# Patient Record
Sex: MALE | Race: WHITE | NOT HISPANIC OR LATINO | Employment: PART TIME | URBAN - METROPOLITAN AREA
[De-identification: names, ages, dates, MRNs, and addresses within clinical notes are randomized per-mention and may not be internally consistent; named-entity substitution may affect disease eponyms.]

---

## 2019-03-03 ENCOUNTER — HOSPITAL ENCOUNTER (EMERGENCY)
Facility: HOSPITAL | Age: 44
Discharge: HOME | End: 2019-03-03
Attending: EMERGENCY MEDICINE

## 2019-03-03 VITALS
TEMPERATURE: 98 F | SYSTOLIC BLOOD PRESSURE: 142 MMHG | BODY MASS INDEX: 29.12 KG/M2 | HEART RATE: 70 BPM | HEIGHT: 72 IN | DIASTOLIC BLOOD PRESSURE: 92 MMHG | RESPIRATION RATE: 17 BRPM | WEIGHT: 215 LBS | OXYGEN SATURATION: 98 %

## 2019-03-03 DIAGNOSIS — M10.9 ACUTE GOUT OF RIGHT KNEE, UNSPECIFIED CAUSE: Primary | ICD-10-CM

## 2019-03-03 PROCEDURE — 99283 EMERGENCY DEPT VISIT LOW MDM: CPT

## 2019-03-03 PROCEDURE — 25000003 PHARM REV CODE 250: Performed by: EMERGENCY MEDICINE

## 2019-03-03 PROCEDURE — 63600175 PHARM REV CODE 636 W HCPCS: Performed by: EMERGENCY MEDICINE

## 2019-03-03 RX ORDER — PREDNISONE 20 MG/1
80 TABLET ORAL
Status: COMPLETED | OUTPATIENT
Start: 2019-03-03 | End: 2019-03-03

## 2019-03-03 RX ORDER — PREDNISONE 20 MG/1
40 TABLET ORAL DAILY
Qty: 10 TABLET | Refills: 0 | Status: SHIPPED | OUTPATIENT
Start: 2019-03-03 | End: 2019-03-08

## 2019-03-03 RX ORDER — ACETAMINOPHEN 500 MG
1000 TABLET ORAL
Status: COMPLETED | OUTPATIENT
Start: 2019-03-03 | End: 2019-03-03

## 2019-03-03 RX ADMIN — PREDNISONE 80 MG: 20 TABLET ORAL at 07:03

## 2019-03-03 RX ADMIN — ACETAMINOPHEN 1000 MG: 500 TABLET ORAL at 07:03

## 2019-03-04 NOTE — ED NOTES
Intermittent pain and swelling to right knee x years. reports hx of gout. Denies recent injury. Distal cms intact.

## 2019-03-04 NOTE — ED PROVIDER NOTES
"Encounter Date: 3/3/2019    SCRIBE #1 NOTE: I, Garrick Parisi, am scribing for, and in the presence of,  Dr. Harvey Juares. I have scribed the entire note.       History     Chief Complaint   Patient presents with    Knee Pain     pt presents to ED today reports history of gout. c/o knee pain onset this am      Time seen by provider: 6:57 PM    This is a 43 y.o. male who presents with complaint of gout flare up. He reports prior history of gout. Patient reports last flare up "a couple months ago". Patient has taken Indomethacin, Allopurinol, and Colchicine before in the past. Patient drove down from [place] and does not have medication with him. He denies fever. He reports history of HTN.      The history is provided by the patient.     Review of patient's allergies indicates:  No Known Allergies  No past medical history on file.  No past surgical history on file.  No family history on file.  Social History     Tobacco Use    Smoking status: Not on file   Substance Use Topics    Alcohol use: Not on file    Drug use: Not on file     Review of Systems   Constitutional: Negative for fever.   Musculoskeletal:        + right knee pain and swelling   All other systems reviewed and are negative.      Physical Exam     Initial Vitals [03/03/19 1820]   BP Pulse Resp Temp SpO2   (!) 142/92 70 17 98 °F (36.7 °C) 98 %      MAP       --         Physical Exam    Nursing note and vitals reviewed.  Constitutional: He appears well-developed and well-nourished. He appears distressed (in pain).   HENT:   Head: Normocephalic and atraumatic.   Mouth/Throat: Oropharynx is clear and moist.   Eyes: EOM are normal. Pupils are equal, round, and reactive to light.   Neck: Normal range of motion. Neck supple.   Musculoskeletal: Normal range of motion. He exhibits edema.   RLE: Right knee with mild erythema with moderate joint effusion.  Full ROM of right knee but pain with ROM.  No cellulitis or abscess.   Normal pulses.     Neurological: He " is alert and oriented to person, place, and time.   Skin: Skin is warm and dry.         ED Course   Procedures  Labs Reviewed - No data to display       Imaging Results    None          Medical Decision Making:   Initial Assessment:   The patient has acute gouty arthritis consistent with prior episodes in the past.  He is afebrile and I do no suspect septic arthritis.  Prednisone has worked in the past so i will treat him with that today.                      Clinical Impression:     1. Acute gout of right knee, unspecified cause            Disposition:   Disposition: Discharged  Condition: Stable       I, Dr. Valerio Juares, personally performed the services described in this documentation. All medical record entries made by the scribe were at my direction and in my presence.  I have reviewed the chart and agree that the record reflects my personal performance and is accurate and complete. Valerio Juares MD.                 Harvey Juares MD  03/04/19 0691